# Patient Record
Sex: MALE | Race: WHITE | NOT HISPANIC OR LATINO | Employment: FULL TIME | URBAN - NONMETROPOLITAN AREA
[De-identification: names, ages, dates, MRNs, and addresses within clinical notes are randomized per-mention and may not be internally consistent; named-entity substitution may affect disease eponyms.]

---

## 2023-11-29 ENCOUNTER — OFFICE VISIT (OUTPATIENT)
Dept: PRIMARY CARE | Facility: CLINIC | Age: 58
End: 2023-11-29
Payer: COMMERCIAL

## 2023-11-29 VITALS
OXYGEN SATURATION: 95 % | HEART RATE: 52 BPM | TEMPERATURE: 97.5 F | HEIGHT: 72 IN | WEIGHT: 199.5 LBS | DIASTOLIC BLOOD PRESSURE: 72 MMHG | BODY MASS INDEX: 27.02 KG/M2 | RESPIRATION RATE: 14 BRPM | SYSTOLIC BLOOD PRESSURE: 114 MMHG

## 2023-11-29 DIAGNOSIS — N13.8 BPH WITH OBSTRUCTION/LOWER URINARY TRACT SYMPTOMS: ICD-10-CM

## 2023-11-29 DIAGNOSIS — N52.9 ERECTILE DYSFUNCTION, UNSPECIFIED ERECTILE DYSFUNCTION TYPE: ICD-10-CM

## 2023-11-29 DIAGNOSIS — R06.2 WHEEZE: ICD-10-CM

## 2023-11-29 DIAGNOSIS — Z00.00 HEALTHCARE MAINTENANCE: ICD-10-CM

## 2023-11-29 DIAGNOSIS — R05.9 COUGH, UNSPECIFIED TYPE: ICD-10-CM

## 2023-11-29 DIAGNOSIS — N40.1 BPH WITH OBSTRUCTION/LOWER URINARY TRACT SYMPTOMS: ICD-10-CM

## 2023-11-29 DIAGNOSIS — R39.11 URINARY HESITANCY: ICD-10-CM

## 2023-11-29 DIAGNOSIS — E78.5 HYPERLIPIDEMIA, UNSPECIFIED HYPERLIPIDEMIA TYPE: ICD-10-CM

## 2023-11-29 DIAGNOSIS — Z13.6 SCREENING FOR CARDIOVASCULAR CONDITION: ICD-10-CM

## 2023-11-29 DIAGNOSIS — R35.0 URINARY FREQUENCY: Primary | ICD-10-CM

## 2023-11-29 DIAGNOSIS — Z12.5 SCREENING FOR PROSTATE CANCER: ICD-10-CM

## 2023-11-29 PROBLEM — G44.009 CLUSTER HEADACHES: Status: ACTIVE | Noted: 2023-11-29

## 2023-11-29 PROBLEM — S83.519A ANTERIOR CRUCIATE LIGAMENT DISRUPTION: Status: ACTIVE | Noted: 2023-11-29

## 2023-11-29 PROBLEM — M75.02 ADHESIVE CAPSULITIS OF LEFT SHOULDER: Status: ACTIVE | Noted: 2023-11-29

## 2023-11-29 LAB
POC APPEARANCE, URINE: CLEAR
POC BILIRUBIN, URINE: NEGATIVE
POC BLOOD, URINE: NEGATIVE
POC COLOR, URINE: YELLOW
POC GLUCOSE, URINE: NEGATIVE MG/DL
POC KETONES, URINE: NEGATIVE MG/DL
POC LEUKOCYTES, URINE: NEGATIVE
POC NITRITE,URINE: NEGATIVE
POC PH, URINE: 6 PH
POC PROTEIN, URINE: NEGATIVE MG/DL
POC SPECIFIC GRAVITY, URINE: 1.02
POC UROBILINOGEN, URINE: 0.2 EU/DL

## 2023-11-29 PROCEDURE — 81003 URINALYSIS AUTO W/O SCOPE: CPT | Performed by: FAMILY MEDICINE

## 2023-11-29 PROCEDURE — 1036F TOBACCO NON-USER: CPT | Performed by: FAMILY MEDICINE

## 2023-11-29 PROCEDURE — 99214 OFFICE O/P EST MOD 30 MIN: CPT | Performed by: FAMILY MEDICINE

## 2023-11-29 RX ORDER — SILDENAFIL 100 MG/1
TABLET, FILM COATED ORAL
COMMUNITY
Start: 2018-02-07 | End: 2023-11-29 | Stop reason: SDUPTHER

## 2023-11-29 RX ORDER — SILDENAFIL 100 MG/1
100 TABLET, FILM COATED ORAL AS NEEDED
Qty: 30 TABLET | Refills: 3 | Status: SHIPPED | OUTPATIENT
Start: 2023-11-29

## 2023-11-29 RX ORDER — SILDENAFIL 100 MG/1
100 TABLET, FILM COATED ORAL AS NEEDED
Qty: 10 TABLET | Refills: 1 | Status: SHIPPED | OUTPATIENT
Start: 2023-11-29 | End: 2023-11-29 | Stop reason: SDUPTHER

## 2023-11-29 NOTE — PROGRESS NOTES
Subjective   Patient ID: Miguel Angel Qiu is a 58 y.o. male who presents for Urinary Frequency (Pt presents for urinary frequency- pt states that when he gets the urge he has to go right now, pt states that in the last couple of years it has gotten much worse, slow stream, when going he feels like he has to go but there will not be much), blood work (Pt would also like to get some routine blood work checked.), and Flu Vaccine (Pt declines flu vaccine at this time. ).    HPI     Last saw physician in our office (Shilpa) in 2021  Last saw me in 2018    Patient here for evaluation of urinary issues.  He would also like routine labs.  Declines influenza vaccine.    Patient reports both urinary frequency and urinary hesitancy progressively worsening for 10+ years now but worse in the last couple of years.     He has slow stream and although he will have urgency does not have much come. This occurs during the day but worse when getting out of bed, does get a little better when up and moving around.    Notes may have slight burning sensation when finally able to get stream going but not always.    Was prescribed Flomax in distant past but had side effects on this so stopped. Had retrograde ejaculation and this freaked him out.  Would be willing to do retrial with the Flomax.    Last PSA was 0.64 in 2021.    Additionally, wants to discuss cough and wheezing.  He is a , does get lung toxin exposure from this.  Finds that when he laughs tends to turn into coughing.  Also having wheezing with expiration occasionally.  Finds improvement if he takes a few deep breaths but usually returns.  Most often when laying down at night but does happen otherwise.  Does not notice when at gym or exerting himself.    Always blowing his nose, states has been this way his entire life.  Constant throughout the day but worse in AM.  No symptoms of heartburn.    Non-smoker.  No EtOH use, stopped years ago, has not drank in 7 years  now.    He had CT cardiac scoring in 2017 that was 1.6 (no mention of lung abnormalities at that time).    Review of Systems   All other systems reviewed and are negative.      Objective   /72 (BP Location: Left arm, Patient Position: Sitting, BP Cuff Size: Large adult)   Pulse 52   Temp 36.4 °C (97.5 °F) (Temporal)   Resp 14   Ht 1.829 m (6')   Wt 90.5 kg (199 lb 8 oz)   SpO2 95%   BMI 27.06 kg/m²     Physical Exam  Vitals and nursing note reviewed.   Constitutional:       General: He is not in acute distress.     Appearance: Normal appearance. He is not toxic-appearing.   HENT:      Head: Normocephalic and atraumatic.      Nose:      Right Turbinates: Swollen (mildly edematous and erythematous).      Left Turbinates: Swollen (mildly edematous and erythematous).      Mouth/Throat:      Pharynx: Oropharynx is clear.   Eyes:      Pupils: Pupils are equal, round, and reactive to light.   Neck:      Thyroid: No thyromegaly.   Cardiovascular:      Rate and Rhythm: Regular rhythm.      Heart sounds: No murmur heard.     No friction rub. No gallop.   Pulmonary:      Effort: Pulmonary effort is normal.      Breath sounds: Normal breath sounds. No wheezing, rhonchi or rales.   Abdominal:      General: Bowel sounds are normal. There is no distension.      Palpations: Abdomen is soft. There is no mass.      Tenderness: There is no abdominal tenderness. There is no guarding.   Musculoskeletal:      Right lower leg: No edema.      Left lower leg: No edema.   Lymphadenopathy:      Cervical: No cervical adenopathy.   Neurological:      General: No focal deficit present.      Mental Status: He is alert and oriented to person, place, and time.   Psychiatric:         Mood and Affect: Mood normal.         Behavior: Behavior normal.         Assessment/Plan   Problem List Items Addressed This Visit             ICD-10-CM    BPH with obstruction/lower urinary tract symptoms N40.1, N13.8     Patient w/ long standing history  of known BPH and LUTS ongoing for years now with recent increase in urinary frequency, hesitancy, and urgency. Wishes to do retrial of Flomax when he was prescribed in past but stopped due to side effect of retrograde ejaculation. Agreeable to starting this back at low dose to see if effective or able to tolerate.    UA done in office today.  PSA ordered with routine blood work (Last PSA was 0.64 in 2021 which was normal).         Erectile dysfunction N52.9     Sildenafil refilled today  Can use good rx if cost prohibitive via insurance.         Relevant Medications    sildenafil (Viagra) 100 mg tablet    Hyperlipidemia E78.5     2017 CACS 1.6  Repeat lipid panel ordered today.  Repeat CT cardiac scoring ordered today.  Can further discuss at next routine visit.    We have ordered a coronary artery calcium score to better determine how to treat your elevated cholesterol. This is a CT scan to determine if you have calcified plaque in your coronary arteries. Coronary artery calcium scores are used to help us determine how to best treat your elevated cholesterol.  The risks of this screen is we may find things that we are not looking for that we will have to follow up on such as lung nodules which are very common in [facility] fatty liver which is common in individuals that are overweight. There is also risk of radiation exposure. Follow up visit will be scheduled to review coronary artery calcium score,          Healthcare maintenance Z00.00     Routine labs ordered today, patient to return to review these and complete physical/wellness portion of visit.  CPE not completed today - only labs ordered.         Relevant Orders    CBC    Comprehensive Metabolic Panel    Lipid Panel    Cough R05.9     + cough with laughing, + wheezing with expiration, + post-nasal drainage  No chest pain, no SOB, no heartburn.  Lungs clear to auscultation on exam today.  VS stable, SpO2 95%    Repeat CT cardiac scoring ordered today,  lungs will be evaluated on this.  PFTs (pulmonary function test) ordered today.    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  Non-smoker  No EtOH since 2016    Suspect possible component of silent reflux, can further discuss upon results of above studies at follow-up.         Relevant Orders    Pulmonary function testing (Ancillary Performed)     Other Visit Diagnoses         Codes    Urinary frequency    -  Primary R35.0    Relevant Orders    POCT UA Automated manually resulted (Completed)    Urinary hesitancy     R39.11    Screening for prostate cancer     Z12.5    Relevant Orders    Prostate Specific Antigen, Screen    Wheeze     R06.2    Relevant Orders    Pulmonary function testing (Ancillary Performed)    Screening for cardiovascular condition     Z13.6    Relevant Orders    CT cardiac scoring wo IV contrast            Follow-up in 3-4 months for recheck above + review studies + CPE.  Call for sooner follow-up if needed.     Scribe Attestation  By signing my name below, IBarbi Scribe   attest that this documentation has been prepared under the direction and in the presence of Ann Cabrera DO.

## 2023-11-29 NOTE — ASSESSMENT & PLAN NOTE
+ cough with laughing, + wheezing with expiration, + post-nasal drainage  No chest pain, no SOB, no heartburn.  Lungs clear to auscultation on exam today.  VS stable, SpO2 95%    Repeat CT cardiac scoring ordered today, lungs will be evaluated on this.  PFTs (pulmonary function test) ordered today.    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  Non-smoker  No EtOH since 2016    Suspect possible component of silent reflux, can further discuss upon results of above studies at follow-up.  
2017 CACS 1.6  Repeat lipid panel ordered today.  Repeat CT cardiac scoring ordered today.  Can further discuss at next routine visit.    We have ordered a coronary artery calcium score to better determine how to treat your elevated cholesterol. This is a CT scan to determine if you have calcified plaque in your coronary arteries. Coronary artery calcium scores are used to help us determine how to best treat your elevated cholesterol.  The risks of this screen is we may find things that we are not looking for that we will have to follow up on such as lung nodules which are very common in [facility] fatty liver which is common in individuals that are overweight. There is also risk of radiation exposure. Follow up visit will be scheduled to review coronary artery calcium score,   
Patient w/ long standing history of known BPH and LUTS ongoing for years now with recent increase in urinary frequency, hesitancy, and urgency. Wishes to do retrial of Flomax when he was prescribed in past but stopped due to side effect of retrograde ejaculation. Agreeable to starting this back at low dose to see if effective or able to tolerate.    UA done in office today.  PSA ordered with routine blood work (Last PSA was 0.64 in 2021 which was normal).  
Routine labs ordered today, patient to return to review these and complete physical/wellness portion of visit.  CPE not completed today - only labs ordered.  
Sildenafil refilled today  Can use good rx if cost prohibitive via insurance.  
Detail Level: Zone
Detail Level: Simple

## 2023-12-08 ENCOUNTER — TELEPHONE (OUTPATIENT)
Dept: PRIMARY CARE | Facility: CLINIC | Age: 58
End: 2023-12-08
Payer: COMMERCIAL

## 2023-12-08 NOTE — TELEPHONE ENCOUNTER
Patient is looking for an order to be sent to [facility] in [place] for the Flowmax .        [phone]

## 2023-12-09 ENCOUNTER — LAB (OUTPATIENT)
Dept: LAB | Facility: LAB | Age: 58
End: 2023-12-09
Payer: COMMERCIAL

## 2023-12-09 DIAGNOSIS — Z00.00 HEALTHCARE MAINTENANCE: ICD-10-CM

## 2023-12-09 DIAGNOSIS — N40.1 BPH WITH OBSTRUCTION/LOWER URINARY TRACT SYMPTOMS: Primary | ICD-10-CM

## 2023-12-09 DIAGNOSIS — Z12.5 SCREENING FOR PROSTATE CANCER: ICD-10-CM

## 2023-12-09 DIAGNOSIS — N13.8 BPH WITH OBSTRUCTION/LOWER URINARY TRACT SYMPTOMS: Primary | ICD-10-CM

## 2023-12-09 LAB
ALBUMIN SERPL BCP-MCNC: 4.9 G/DL (ref 3.4–5)
ALP SERPL-CCNC: 87 U/L (ref 33–120)
ALT SERPL W P-5'-P-CCNC: 17 U/L (ref 10–52)
ANION GAP SERPL CALC-SCNC: 15 MMOL/L (ref 10–20)
AST SERPL W P-5'-P-CCNC: 18 U/L (ref 9–39)
BILIRUB SERPL-MCNC: 1.5 MG/DL (ref 0–1.2)
BUN SERPL-MCNC: 22 MG/DL (ref 6–23)
CALCIUM SERPL-MCNC: 10.3 MG/DL (ref 8.6–10.6)
CHLORIDE SERPL-SCNC: 104 MMOL/L (ref 98–107)
CHOLEST SERPL-MCNC: 315 MG/DL (ref 0–199)
CHOLESTEROL/HDL RATIO: 7.2
CO2 SERPL-SCNC: 28 MMOL/L (ref 21–32)
CREAT SERPL-MCNC: 1.27 MG/DL (ref 0.5–1.3)
ERYTHROCYTE [DISTWIDTH] IN BLOOD BY AUTOMATED COUNT: 12.9 % (ref 11.5–14.5)
GFR SERPL CREATININE-BSD FRML MDRD: 66 ML/MIN/1.73M*2
GLUCOSE SERPL-MCNC: 104 MG/DL (ref 74–99)
HCT VFR BLD AUTO: 47.6 % (ref 41–52)
HDLC SERPL-MCNC: 43.8 MG/DL
HGB BLD-MCNC: 15 G/DL (ref 13.5–17.5)
LDLC SERPL CALC-MCNC: 247 MG/DL
MCH RBC QN AUTO: 29.4 PG (ref 26–34)
MCHC RBC AUTO-ENTMCNC: 31.5 G/DL (ref 32–36)
MCV RBC AUTO: 93 FL (ref 80–100)
NON HDL CHOLESTEROL: 271 MG/DL (ref 0–149)
NRBC BLD-RTO: 0 /100 WBCS (ref 0–0)
PLATELET # BLD AUTO: 267 X10*3/UL (ref 150–450)
POTASSIUM SERPL-SCNC: 4.6 MMOL/L (ref 3.5–5.3)
PROT SERPL-MCNC: 7.7 G/DL (ref 6.4–8.2)
RBC # BLD AUTO: 5.1 X10*6/UL (ref 4.5–5.9)
SODIUM SERPL-SCNC: 142 MMOL/L (ref 136–145)
TRIGL SERPL-MCNC: 122 MG/DL (ref 0–149)
VLDL: 24 MG/DL (ref 0–40)
WBC # BLD AUTO: 5.8 X10*3/UL (ref 4.4–11.3)

## 2023-12-09 PROCEDURE — 85027 COMPLETE CBC AUTOMATED: CPT

## 2023-12-09 PROCEDURE — 80053 COMPREHEN METABOLIC PANEL: CPT

## 2023-12-09 PROCEDURE — 80061 LIPID PANEL: CPT

## 2023-12-09 PROCEDURE — 84153 ASSAY OF PSA TOTAL: CPT

## 2023-12-09 PROCEDURE — 36415 COLL VENOUS BLD VENIPUNCTURE: CPT

## 2023-12-09 RX ORDER — TAMSULOSIN HYDROCHLORIDE 0.4 MG/1
0.4 CAPSULE ORAL DAILY
Qty: 30 CAPSULE | Refills: 11 | Status: SHIPPED | OUTPATIENT
Start: 2023-12-09 | End: 2024-12-08

## 2023-12-10 LAB — PSA SERPL-MCNC: 0.66 NG/ML

## 2023-12-12 ENCOUNTER — HOSPITAL ENCOUNTER (OUTPATIENT)
Dept: RESPIRATORY THERAPY | Facility: HOSPITAL | Age: 58
Discharge: HOME | End: 2023-12-12
Payer: COMMERCIAL

## 2023-12-12 DIAGNOSIS — R05.9 COUGH, UNSPECIFIED TYPE: ICD-10-CM

## 2023-12-12 DIAGNOSIS — R06.2 WHEEZE: ICD-10-CM

## 2023-12-12 PROCEDURE — 94726 PLETHYSMOGRAPHY LUNG VOLUMES: CPT

## 2023-12-13 LAB
MGC ASCENT PFT - FEV1 - POST: 4.03
MGC ASCENT PFT - FEV1 - PRE: 3.47
MGC ASCENT PFT - FEV1 - PREDICTED: 3.73
MGC ASCENT PFT - FVC - POST: 6.3
MGC ASCENT PFT - FVC - PRE: 5.74
MGC ASCENT PFT - FVC - PREDICTED: 4.79

## 2023-12-21 ENCOUNTER — OFFICE VISIT (OUTPATIENT)
Dept: PRIMARY CARE | Facility: CLINIC | Age: 58
End: 2023-12-21
Payer: COMMERCIAL

## 2023-12-21 VITALS
SYSTOLIC BLOOD PRESSURE: 95 MMHG | WEIGHT: 198 LBS | HEART RATE: 57 BPM | HEIGHT: 72 IN | TEMPERATURE: 96.9 F | RESPIRATION RATE: 14 BRPM | OXYGEN SATURATION: 96 % | DIASTOLIC BLOOD PRESSURE: 61 MMHG | BODY MASS INDEX: 26.82 KG/M2

## 2023-12-21 DIAGNOSIS — R73.9 HYPERGLYCEMIA: ICD-10-CM

## 2023-12-21 DIAGNOSIS — E80.4 GILBERT'S SYNDROME: ICD-10-CM

## 2023-12-21 DIAGNOSIS — E66.3 OVERWEIGHT WITH BODY MASS INDEX (BMI) OF 26 TO 26.9 IN ADULT: ICD-10-CM

## 2023-12-21 DIAGNOSIS — Z00.00 HEALTHCARE MAINTENANCE: Primary | ICD-10-CM

## 2023-12-21 DIAGNOSIS — N40.1 BPH WITH OBSTRUCTION/LOWER URINARY TRACT SYMPTOMS: ICD-10-CM

## 2023-12-21 DIAGNOSIS — N13.8 BPH WITH OBSTRUCTION/LOWER URINARY TRACT SYMPTOMS: ICD-10-CM

## 2023-12-21 DIAGNOSIS — E78.5 HYPERLIPIDEMIA, UNSPECIFIED HYPERLIPIDEMIA TYPE: ICD-10-CM

## 2023-12-21 PROCEDURE — 3008F BODY MASS INDEX DOCD: CPT | Performed by: FAMILY MEDICINE

## 2023-12-21 PROCEDURE — 90750 HZV VACC RECOMBINANT IM: CPT | Performed by: FAMILY MEDICINE

## 2023-12-21 PROCEDURE — 1036F TOBACCO NON-USER: CPT | Performed by: FAMILY MEDICINE

## 2023-12-21 PROCEDURE — 99396 PREV VISIT EST AGE 40-64: CPT | Performed by: FAMILY MEDICINE

## 2023-12-21 PROCEDURE — 90471 IMMUNIZATION ADMIN: CPT | Performed by: FAMILY MEDICINE

## 2023-12-21 NOTE — ASSESSMENT & PLAN NOTE
Currently lifestyle managed  2017 CACS 1.6 - repeat ordered 11/2023 12/2023 TC/HDL ratio 7.2, HDL 43, , TRIG 271  Goal TC/HDL ratio 3.4 or less, LDL 99 or less,  or less, and TRIG 150 or less.     Cholesterol significantly elevated, cardiac guidelines recommend starting statin therapy with LDL >190 in someone over the age of 40 regardless of results of the CT cardiac scoring.    Await repeat CT cardiac scoring, he will see jimmy today to schedule.    Would recommend statin based on cholesterol numbers alone, patient wishes to have repeat scan before deciding. He states he has not changed his diet or activity level since last scan in 2017 that was a score of 1.6. If significant amount of plaque seen on repeat he would be open to starting statin.    In the interim, non-prescription measures recommended:  -can do trial with supplement Bergamot, see patient summary.  -make sure you are avoiding refined carbs such as breads, pasta, cereal, candy, soda,  nutrition bars, granola, chips, and sugar sweetened beverages.      -eat 5- 7 servings daily of veggies,  healthy protein such as chicken, fish,  beans, and eggs, and include healthy fats in your diet such as seeds, nuts, olive oil, avocados, and salmon.   -exercise 4 - 6 days per week as you are able, 150 minutes total weekly divided up is recommended.  -consider taking the fiber product psyllium capsules or powder 2 times daily (generic brand is fine) , or a brand name psyllium such as [employer] or Metamucil.  -consider also adding plant stanols and sterols such as Nature Made CholestOff, available over the counter.

## 2023-12-21 NOTE — PATIENT INSTRUCTIONS
Bergamot 500 - 1000 mg daily x 3 mo on     3 mo off   can repeat 2 x yearly    Vit D3 2000 international units daily     Hyperlipidemia  Currently lifestyle managed  2017 CACS 1.6 - repeat ordered 11/2023 12/2023 TC/HDL ratio 7.2, HDL 43, , TRIG 271  Goal TC/HDL ratio 3.4 or less, LDL 99 or less,  or less, and TRIG 150 or less.     Cholesterol significantly elevated, cardiac guidelines recommend starting statin therapy with LDL >190 in someone over the age of 40 regardless of results of the CT cardiac scoring.    Await repeat CT cardiac scoring, he will see jimmy today to schedule.    Would recommend statin based on cholesterol numbers alone, patient wishes to have repeat scan before deciding. He states he has not changed his diet or activity level since last scan in 2017 that was a score of 1.6. If significant amount of plaque seen on repeat he would be open to starting statin.    In the interim, non-prescription measures recommended:  -can do trial with supplement Bergamot, see patient summary.  -make sure you are avoiding refined carbs such as breads, pasta, cereal, candy, soda,  nutrition bars, granola, chips, and sugar sweetened beverages.      -eat 5- 7 servings daily of veggies,  healthy protein such as chicken, fish,  beans, and eggs, and include healthy fats in your diet such as seeds, nuts, olive oil, avocados, and salmon.   -exercise 4 - 6 days per week as you are able, 150 minutes total weekly divided up is recommended.  -consider taking the fiber product psyllium capsules or powder 2 times daily (generic brand is fine) , or a brand name psyllium such as [employer] [facility] or Metamucil.  -consider also adding plant stanols and sterols such as Nature Made CholestOff, available over the counter.     Hyperglycemia  Mildly elevated fasting glucose, very mild prediabetic range.  No recent A1c (3 month long glucose average), will get A1c with next lab work.  Diet and exercise recommendations  revisited, see hyperlipidemia A/P and wellness A/P.      Healthcare maintenance  Vaccines and screenings reviewed.  Questionnaires completed.  Health and wellness topics reviewed.  Diet and exercise recommendations revisited.  Routine blood work reviewed today.     VACCINES:  -Flu vaccine deferred today  -TDAP deferred today  --Shingrix #1 administered today, #2 (final dose) due anytime after 2 months from today.  Shingles vaccine (Shingrix) is recommended.  This vaccine is expensive but extremely effective.   This is to be administered in 2 separate doses, 2- 6 months apart.   This is a killed virus vaccine, so no risk of chicken pox or shingles with administration.  The vaccine is approximately 90 % effective to protect against shingles even 5 years out.   Side effects of the vaccine can include soreness of the arm at administration site and possible flu-like symptoms after administration.    SCREENINGS:  -Screening PSA is UTD, normal 12/2023  -Screening colonoscopy is UTD, completed in 2020, repeat in 5 years per GI.    LIFESTYLE MEASURES  -make sure you are avoiding refined carbs such as breads, pasta, cereal, candy, soda,  nutrition bars, granola, chips, and sugar sweetened beverages.      -eat 5- 7 servings daily of veggies,  healthy protein such as chicken, fish,  beans, and eggs, and include healthy fats in your diet such as seeds, nuts, olive oil, avocados, and salmon.   -exercise 4 - 6 days per week as you are able, 150 minutes total weekly divided up is recommended.  -Vitamin D is recommended at 1000 - 5000 IU international units daily.   -Always wear sunscreen when you have sun exposure.  -64 oz of water is recommended daily.  -Dental visits recommended every 6 months.  -Eye exam recommended every 2 years, for those with vision problems every year.

## 2023-12-21 NOTE — ASSESSMENT & PLAN NOTE
Mildly elevated fasting glucose, very mild prediabetic range.  No recent A1c (3 month long glucose average), will get A1c with next lab work.  Diet and exercise recommendations revisited, see hyperlipidemia A/P and wellness A/P.

## 2023-12-21 NOTE — ASSESSMENT & PLAN NOTE
Vaccines and screenings reviewed.  Questionnaires completed.  Health and wellness topics reviewed.  Diet and exercise recommendations revisited.  Routine blood work reviewed today.     VACCINES:  -Flu vaccine deferred today  -TDAP deferred today  --Shingrix #1 administered today, #2 (final dose) due anytime after 2 months from today.  Shingles vaccine (Shingrix) is recommended.  This vaccine is expensive but extremely effective.   This is to be administered in 2 separate doses, 2- 6 months apart.   This is a killed virus vaccine, so no risk of chicken pox or shingles with administration.  The vaccine is approximately 90 % effective to protect against shingles even 5 years out.   Side effects of the vaccine can include soreness of the arm at administration site and possible flu-like symptoms after administration.    SCREENINGS:  -Screening PSA is UTD, normal 12/2023  -Screening colonoscopy is UTD, completed in 2020, repeat in 5 years per GI.    LIFESTYLE MEASURES  -make sure you are avoiding refined carbs such as breads, pasta, cereal, candy, soda,  nutrition bars, granola, chips, and sugar sweetened beverages.      -eat 5- 7 servings daily of veggies,  healthy protein such as chicken, fish,  beans, and eggs, and include healthy fats in your diet such as seeds, nuts, olive oil, avocados, and salmon.   -exercise 4 - 6 days per week as you are able, 150 minutes total weekly divided up is recommended.  -Vitamin D is recommended at 1000 - 5000 IU international units daily.   -Always wear sunscreen when you have sun exposure.  -64 oz of water is recommended daily.  -Dental visits recommended every 6 months.  -Eye exam recommended every 2 years, for those with vision problems every year.

## 2023-12-21 NOTE — PROGRESS NOTES
Subjective   Patient ID: Miguel Angel Qiu is a 58 y.o. male who presents for Annual Exam (Pt presents for annual physical exam- ABN given to pt and signed, pt verbalized understanding. ) and Flu Vaccine (Pt declines flu vaccine at this time.).    HPI     Patient presents today for annual physical.  Patient is doing well overall, they have no new concerns or issues.     WELLNESS VISIT   TDAP: none found  SHINGRIX: none found  PNEUMOVAX: N/A  CSCOPE: 5/2020 (normal mucosa, hemorrhoids, repeat 5 years per GI (hx of adenomatous polyp in 2017)   PSA: 0.66 in 12/2023  AAA SCREEN: N/A  HEP C SCREEN: none found  CACS: 1.6 in 2017 - repeat CACS ordered 11/2023    Patient declines influenza vaccine.  Patient is agreeable to Shingrix vaccine.  Patient declines TDAP vaccine.    Diet: overall healthy  Exercise: active, fit  Alcohol use: none  Smoking: non-smoker    Dental: UTD  Vision: UTD    Prostate cancer screening: UTD  Denies family history in first degree relative.  Denies hesitancy, nocturia, or urgency.     Colon cancer screening: UTD  Denies family history in first degree relative.  Denies melena, hematochezia, constipation, diarrhea, bloating, change in bowel habits.     ROUTINE VISIT  CHRONIC CONDITIONS:   -COUGH  + cough with laughing, + wheezing with expiration, + post-nasal drainage  No chest pain, no SOB, no heartburn.  Lungs clear to auscultation on exam today.  VS stable, SpO2 95%     Repeat CT cardiac scoring ordered today, lungs will be evaluated on this.  PFTs (pulmonary function test) ordered today.     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  Non-smoker  No EtOH since 2016     Suspect possible component of silent reflux, can further discuss upon results of above studies at follow-up.    12/12/2023 PFTs: Reduced FEV1/FVC with a normal FVC indicates a mild airflow obstructive defect. Lung volumes by plethysmography indicate hyperinflation process. The DLCO is normal.     -BPH w/ LUTS  Resumed  Flomax per 11/2023 OV, previously d/c'ed due to retrograde ejaculation.    11/2023 urinalysis normal  12/2023 PSA normal    -HLD  Lifestyle managed  2017 CACS 1.6 - repeat ordered 11/2023 12/2023 TC/HDL ratio 7.2, Hdl 43, , TRIG 271    Patient denies any facial droop, sudden vision loss, weakness or numbness on one side of body.   Patient denies chest pain, shortness of breath with exertion, palpitations, or symptoms of claudication.       Review of Systems   All other systems reviewed and are negative.      Objective   BP 95/61 (BP Location: Right arm, Patient Position: Sitting, BP Cuff Size: Large adult)   Pulse 57   Temp 36.1 °C (96.9 °F) (Temporal)   Resp 14   Ht 1.829 m (6')   Wt 89.8 kg (198 lb)   SpO2 96%   BMI 26.85 kg/m²     Physical Exam  Vitals and nursing note reviewed.   Constitutional:       General: He is not in acute distress.     Appearance: Normal appearance. He is not toxic-appearing.   HENT:      Head: Normocephalic and atraumatic.   Neck:      Thyroid: No thyromegaly.   Cardiovascular:      Rate and Rhythm: Normal rate and regular rhythm.      Heart sounds: No murmur heard.     No friction rub. No gallop.   Pulmonary:      Effort: Pulmonary effort is normal.      Breath sounds: Normal breath sounds. No wheezing, rhonchi or rales.   Abdominal:      General: Bowel sounds are normal. There is no distension.      Palpations: Abdomen is soft. There is no mass.      Tenderness: There is no abdominal tenderness. There is no guarding.   Musculoskeletal:      Right lower leg: No edema.      Left lower leg: No edema.   Lymphadenopathy:      Cervical: No cervical adenopathy.   Neurological:      General: No focal deficit present.      Mental Status: He is alert and oriented to person, place, and time.   Psychiatric:         Mood and Affect: Mood normal.         Behavior: Behavior normal.         Assessment/Plan   Problem List Items Addressed This Visit             ICD-10-CM    BPH with  obstruction/lower urinary tract symptoms N40.1, N13.8     Stable, continue Flomax as prescribed.         Hyperlipidemia E78.5     Currently lifestyle managed  2017 CACS 1.6 - repeat ordered 11/2023 12/2023 TC/HDL ratio 7.2, HDL 43, , TRIG 271  Goal TC/HDL ratio 3.4 or less, LDL 99 or less,  or less, and TRIG 150 or less.     Cholesterol significantly elevated, cardiac guidelines recommend starting statin therapy with LDL >190 in someone over the age of 40 regardless of results of the CT cardiac scoring.    Await repeat CT cardiac scoring, he will see jimmy today to schedule.    Would recommend statin based on cholesterol numbers alone, patient wishes to have repeat scan before deciding. He states he has not changed his diet or activity level since last scan in 2017 that was a score of 1.6. If significant amount of plaque seen on repeat he would be open to starting statin.    In the interim, non-prescription measures recommended:  -can do trial with supplement Bergamot, see patient summary.  -make sure you are avoiding refined carbs such as breads, pasta, cereal, candy, soda,  nutrition bars, granola, chips, and sugar sweetened beverages.      -eat 5- 7 servings daily of veggies,  healthy protein such as chicken, fish,  beans, and eggs, and include healthy fats in your diet such as seeds, nuts, olive oil, avocados, and salmon.   -exercise 4 - 6 days per week as you are able, 150 minutes total weekly divided up is recommended.  -consider taking the fiber product psyllium capsules or powder 2 times daily (generic brand is fine) , or a brand name psyllium such as [employer] [facility] [employer] or Metamucil.  -consider also adding plant stanols and sterols such as Nature Made CholestOff, available over the counter.          Healthcare maintenance - Primary Z00.00     Vaccines and screenings reviewed.  Questionnaires completed.  Health and wellness topics reviewed.  Diet and exercise recommendations  revisited.  Routine blood work reviewed today.     VACCINES:  -Flu vaccine deferred today  -TDAP deferred today  --Shingrix #1 administered today, #2 (final dose) due anytime after 2 months from today.  Shingles vaccine (Shingrix) is recommended.  This vaccine is expensive but extremely effective.   This is to be administered in 2 separate doses, 2- 6 months apart.   This is a killed virus vaccine, so no risk of chicken pox or shingles with administration.  The vaccine is approximately 90 % effective to protect against shingles even 5 years out.   Side effects of the vaccine can include soreness of the arm at administration site and possible flu-like symptoms after administration.    SCREENINGS:  -Screening PSA is UTD, normal 12/2023  -Screening colonoscopy is UTD, completed in 2020, repeat in 5 years per GI.    LIFESTYLE MEASURES  -make sure you are avoiding refined carbs such as breads, pasta, cereal, candy, soda,  nutrition bars, granola, chips, and sugar sweetened beverages.      -eat 5- 7 servings daily of veggies,  healthy protein such as chicken, fish,  beans, and eggs, and include healthy fats in your diet such as seeds, nuts, olive oil, avocados, and salmon.   -exercise 4 - 6 days per week as you are able, 150 minutes total weekly divided up is recommended.  -Vitamin D is recommended at 1000 - 5000 IU international units daily.   -Always wear sunscreen when you have sun exposure.  -64 oz of water is recommended daily.  -Dental visits recommended every 6 months.  -Eye exam recommended every 2 years, for those with vision problems every year.           Hyperglycemia R73.9     Mildly elevated fasting glucose, very mild prediabetic range.  No recent A1c (3 month long glucose average), will get A1c with next lab work.  Diet and exercise recommendations revisited, see hyperlipidemia A/P and wellness A/P.           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          Other Visit Diagnoses         Codes     Overweight with body mass index (BMI) of 26 to 26.9 in adult     E66.3, Z68.26            Follow-up in 1 year for routine visit + annual physical.   Call for sooner follow-up if needed.     Scribe Attestation  By signing my name below, IBarbi Scribe   attest that this documentation has been prepared under the direction and in the presence of Ann Cabrera DO.

## 2024-01-02 ENCOUNTER — APPOINTMENT (OUTPATIENT)
Dept: ORTHOPEDIC SURGERY | Facility: CLINIC | Age: 59
End: 2024-01-02
Payer: COMMERCIAL

## 2024-01-12 DIAGNOSIS — M25.511 RIGHT SHOULDER PAIN, UNSPECIFIED CHRONICITY: ICD-10-CM

## 2024-01-16 ENCOUNTER — ANCILLARY PROCEDURE (OUTPATIENT)
Dept: RADIOLOGY | Facility: CLINIC | Age: 59
End: 2024-01-16
Payer: COMMERCIAL

## 2024-01-16 ENCOUNTER — OFFICE VISIT (OUTPATIENT)
Dept: ORTHOPEDIC SURGERY | Facility: CLINIC | Age: 59
End: 2024-01-16
Payer: COMMERCIAL

## 2024-01-16 DIAGNOSIS — M75.01 ADHESIVE CAPSULITIS OF RIGHT SHOULDER: ICD-10-CM

## 2024-01-16 DIAGNOSIS — M25.511 RIGHT SHOULDER PAIN, UNSPECIFIED CHRONICITY: ICD-10-CM

## 2024-01-16 DIAGNOSIS — M19.011 ARTHRITIS OF RIGHT ACROMIOCLAVICULAR JOINT: ICD-10-CM

## 2024-01-16 PROCEDURE — 3008F BODY MASS INDEX DOCD: CPT | Performed by: ORTHOPAEDIC SURGERY

## 2024-01-16 PROCEDURE — 99213 OFFICE O/P EST LOW 20 MIN: CPT | Performed by: ORTHOPAEDIC SURGERY

## 2024-01-16 PROCEDURE — 73030 X-RAY EXAM OF SHOULDER: CPT | Mod: RT

## 2024-01-16 PROCEDURE — 73030 X-RAY EXAM OF SHOULDER: CPT | Mod: RIGHT SIDE | Performed by: RADIOLOGY

## 2024-01-16 PROCEDURE — 1036F TOBACCO NON-USER: CPT | Performed by: ORTHOPAEDIC SURGERY

## 2024-01-16 PROCEDURE — 20610 DRAIN/INJ JOINT/BURSA W/O US: CPT | Performed by: ORTHOPAEDIC SURGERY

## 2024-01-16 RX ORDER — METHYLPREDNISOLONE ACETATE 40 MG/ML
40 INJECTION, SUSPENSION INTRA-ARTICULAR; INTRALESIONAL; INTRAMUSCULAR; SOFT TISSUE
Status: COMPLETED | OUTPATIENT
Start: 2024-01-16 | End: 2024-01-16

## 2024-01-16 RX ORDER — LIDOCAINE HYDROCHLORIDE 10 MG/ML
10 INJECTION INFILTRATION; PERINEURAL
Status: COMPLETED | OUTPATIENT
Start: 2024-01-16 | End: 2024-01-16

## 2024-01-16 RX ADMIN — LIDOCAINE HYDROCHLORIDE 10 ML: 10 INJECTION INFILTRATION; PERINEURAL at 08:50

## 2024-01-16 RX ADMIN — METHYLPREDNISOLONE ACETATE 40 MG: 40 INJECTION, SUSPENSION INTRA-ARTICULAR; INTRALESIONAL; INTRAMUSCULAR; SOFT TISSUE at 08:50

## 2024-01-16 NOTE — PROGRESS NOTES
Patient returns to see me today for his right shoulder.  He has known osteoarthritis of his acromioclavicular joint he responded reasonably well to the injection but it did not last.  Most of the pain continues to be in the AC joint area however he also like on his other side is started to develop a bit of a frozen shoulder on top of it he wants to have surgery for the AC joint however at this point with the stiffness we need to get that improved prior to his surgical intervention.    He lacks about terminal 15 degrees in each direction on range of motion he continues to be very sore over his acromioclavicular joint.  Rotator cuff strength is good.  At this point we gave him an injection to his right glenohumeral joint and subacromial space he will work hard on range of motion over the next month and physical therapy.  He will also obtain an MRI scan of the right shoulder to evaluate his acromioclavicular joint for any other associated pathology    We will plan to see him back in 1 month's time for clinical recheck and if his range of motion is improved we can schedule him for his distal clavicle excision.  We did also review the MRI scan to ensure there is no other associated pathology remains to be addressed at the time of surgery.    Patient ID: Miguel Angel Qiu is a 58 y.o. male.    L Inj/Asp: R subacromial bursa on 1/16/2024 8:50 AM  Indications: pain  Details: 22 G needle, posterior approach  Medications: 40 mg methylPREDNISolone acetate 40 mg/mL; 10 mL lidocaine 10 mg/mL (1 %)    Under sterile conditions the right shoulder was injected with 5cc of lidocaine 40mg Depo.  The patient tolerated the procedure well.  There were no apparent complications.  Sterile bandage was applied.  Procedure, treatment alternatives, risks and benefits explained, specific risks discussed. Consent was given by the patient. Immediately prior to procedure a time out was called to verify the correct patient, procedure, equipment,  support staff and site/side marked as required. Patient was prepped and draped in the usual sterile fashion.

## 2024-01-30 ENCOUNTER — HOSPITAL ENCOUNTER (OUTPATIENT)
Dept: RADIOLOGY | Facility: CLINIC | Age: 59
Discharge: HOME | End: 2024-01-30
Payer: COMMERCIAL

## 2024-01-30 DIAGNOSIS — M19.011 ARTHRITIS OF RIGHT ACROMIOCLAVICULAR JOINT: ICD-10-CM

## 2024-01-30 PROCEDURE — 73221 MRI JOINT UPR EXTREM W/O DYE: CPT | Mod: RT

## 2024-01-30 PROCEDURE — 73221 MRI JOINT UPR EXTREM W/O DYE: CPT | Mod: RIGHT SIDE | Performed by: RADIOLOGY

## 2024-02-09 ENCOUNTER — HOSPITAL ENCOUNTER (OUTPATIENT)
Dept: RADIOLOGY | Facility: CLINIC | Age: 59
Discharge: HOME | End: 2024-02-09
Payer: COMMERCIAL

## 2024-02-09 DIAGNOSIS — Z13.6 SCREENING FOR CARDIOVASCULAR CONDITION: ICD-10-CM

## 2024-02-09 PROCEDURE — 75571 CT HRT W/O DYE W/CA TEST: CPT

## 2024-02-14 ENCOUNTER — APPOINTMENT (OUTPATIENT)
Dept: PRIMARY CARE | Facility: CLINIC | Age: 59
End: 2024-02-14
Payer: COMMERCIAL

## 2024-02-26 ENCOUNTER — APPOINTMENT (OUTPATIENT)
Dept: PRIMARY CARE | Facility: CLINIC | Age: 59
End: 2024-02-26
Payer: COMMERCIAL

## 2024-02-27 ENCOUNTER — TELEPHONE (OUTPATIENT)
Dept: PRIMARY CARE | Facility: CLINIC | Age: 59
End: 2024-02-27

## 2024-02-27 NOTE — TELEPHONE ENCOUNTER
FYI-   Please see CT cardiac scoring test done on 02/09/24  messages has been left for pt to follow up with you and needed a later apt.   I seen on the follow up work list that pt has canceled apts and not followed up re results.

## 2024-03-05 ENCOUNTER — OFFICE VISIT (OUTPATIENT)
Dept: ORTHOPEDIC SURGERY | Facility: CLINIC | Age: 59
End: 2024-03-05
Payer: COMMERCIAL

## 2024-03-05 DIAGNOSIS — M75.02 ADHESIVE CAPSULITIS OF LEFT SHOULDER: Primary | ICD-10-CM

## 2024-03-05 PROCEDURE — 99213 OFFICE O/P EST LOW 20 MIN: CPT | Performed by: ORTHOPAEDIC SURGERY

## 2024-03-05 PROCEDURE — 1036F TOBACCO NON-USER: CPT | Performed by: ORTHOPAEDIC SURGERY

## 2024-03-05 PROCEDURE — 3008F BODY MASS INDEX DOCD: CPT | Performed by: ORTHOPAEDIC SURGERY

## 2024-03-05 NOTE — PROGRESS NOTES
Patient returns to see me today he did physical therapy for his shoulder he regained his range of motion and now the right shoulder feels great he did get an MRI scan that shows some rotator cuff tendinopathy a little bit early glenohumeral arthritis and some AC joint arthritis is pretty severe.  At this point is not bothering him too much so he wants to continue with just physical therapy exercises and conservative care I think is very reasonable.  He does have a little bit of stiffness remaining on the left side so getting him started in physical therapy for that to work on the frozen shoulder.  He may follow-up with us on an as-needed basis

## 2024-08-30 ENCOUNTER — LAB REQUISITION (OUTPATIENT)
Dept: LAB | Facility: HOSPITAL | Age: 59
End: 2024-08-30
Payer: COMMERCIAL

## 2024-08-30 DIAGNOSIS — B34.9 VIRAL INFECTION, UNSPECIFIED: ICD-10-CM

## 2024-08-30 PROCEDURE — 87635 SARS-COV-2 COVID-19 AMP PRB: CPT

## 2024-08-31 LAB — SARS-COV-2 ORF1AB RESP QL NAA+PROBE: NOT DETECTED

## 2024-10-06 DIAGNOSIS — N13.8 BPH WITH OBSTRUCTION/LOWER URINARY TRACT SYMPTOMS: ICD-10-CM

## 2024-10-06 DIAGNOSIS — N40.1 BPH WITH OBSTRUCTION/LOWER URINARY TRACT SYMPTOMS: ICD-10-CM

## 2024-10-07 RX ORDER — TAMSULOSIN HYDROCHLORIDE 0.4 MG/1
0.4 CAPSULE ORAL DAILY
Qty: 90 CAPSULE | Refills: 3 | Status: SHIPPED | OUTPATIENT
Start: 2024-10-07

## 2024-11-29 DIAGNOSIS — N52.9 ERECTILE DYSFUNCTION, UNSPECIFIED ERECTILE DYSFUNCTION TYPE: ICD-10-CM

## 2024-11-30 RX ORDER — SILDENAFIL 100 MG/1
TABLET, FILM COATED ORAL
Qty: 30 TABLET | Refills: 0 | Status: SHIPPED | OUTPATIENT
Start: 2024-11-30

## 2025-01-09 ENCOUNTER — TELEPHONE (OUTPATIENT)
Dept: PRIMARY CARE | Facility: CLINIC | Age: 60
End: 2025-01-09
Payer: COMMERCIAL

## 2025-01-10 ENCOUNTER — TELEPHONE (OUTPATIENT)
Dept: PRIMARY CARE | Facility: CLINIC | Age: 60
End: 2025-01-10

## 2025-01-20 NOTE — TELEPHONE ENCOUNTER
Patient called in today was looking for his blood work orders. He has appointment set up with Vicente in March, but wants to have something to talk about with her. He is taking time off work and doesn't want to do this twice

## 2025-01-21 NOTE — TELEPHONE ENCOUNTER
Fasting  labs ordered ,   fast min 8 h     - pls do 5-7 d before your appt w dr beyer to review w her at the visit

## 2025-03-05 ENCOUNTER — APPOINTMENT (OUTPATIENT)
Dept: PRIMARY CARE | Facility: CLINIC | Age: 60
End: 2025-03-05
Payer: COMMERCIAL

## 2025-04-06 LAB
ALBUMIN SERPL-MCNC: 4.5 G/DL (ref 3.6–5.1)
ALP SERPL-CCNC: 82 U/L (ref 35–144)
ALT SERPL-CCNC: 26 U/L (ref 9–46)
ANION GAP SERPL CALCULATED.4IONS-SCNC: 8 MMOL/L (CALC) (ref 7–17)
AST SERPL-CCNC: 22 U/L (ref 10–35)
BILIRUB SERPL-MCNC: 1.2 MG/DL (ref 0.2–1.2)
BUN SERPL-MCNC: 26 MG/DL (ref 7–25)
CALCIUM SERPL-MCNC: 9.7 MG/DL (ref 8.6–10.3)
CHLORIDE SERPL-SCNC: 105 MMOL/L (ref 98–110)
CHOLEST SERPL-MCNC: 267 MG/DL
CHOLEST/HDLC SERPL: 5.6 (CALC)
CO2 SERPL-SCNC: 27 MMOL/L (ref 20–32)
CREAT SERPL-MCNC: 1.2 MG/DL (ref 0.7–1.3)
EGFRCR SERPLBLD CKD-EPI 2021: 70 ML/MIN/1.73M2
ERYTHROCYTE [DISTWIDTH] IN BLOOD BY AUTOMATED COUNT: 12.9 % (ref 11–15)
EST. AVERAGE GLUCOSE BLD GHB EST-MCNC: 117 MG/DL
EST. AVERAGE GLUCOSE BLD GHB EST-SCNC: 6.5 MMOL/L
GLUCOSE SERPL-MCNC: 103 MG/DL (ref 65–99)
HBA1C MFR BLD: 5.7 % OF TOTAL HGB
HCT VFR BLD AUTO: 45.1 % (ref 38.5–50)
HDLC SERPL-MCNC: 48 MG/DL
HGB BLD-MCNC: 15 G/DL (ref 13.2–17.1)
LDLC SERPL CALC-MCNC: 205 MG/DL (CALC)
MCH RBC QN AUTO: 30.4 PG (ref 27–33)
MCHC RBC AUTO-ENTMCNC: 33.3 G/DL (ref 32–36)
MCV RBC AUTO: 91.5 FL (ref 80–100)
NONHDLC SERPL-MCNC: 219 MG/DL (CALC)
PLATELET # BLD AUTO: 225 THOUSAND/UL (ref 140–400)
PMV BLD REES-ECKER: 9.7 FL (ref 7.5–12.5)
POTASSIUM SERPL-SCNC: 4.9 MMOL/L (ref 3.5–5.3)
PROT SERPL-MCNC: 7 G/DL (ref 6.1–8.1)
PSA SERPL-MCNC: 0.6 NG/ML
RBC # BLD AUTO: 4.93 MILLION/UL (ref 4.2–5.8)
SODIUM SERPL-SCNC: 140 MMOL/L (ref 135–146)
TRIGL SERPL-MCNC: 51 MG/DL
WBC # BLD AUTO: 4.6 THOUSAND/UL (ref 3.8–10.8)

## 2025-05-14 ENCOUNTER — APPOINTMENT (OUTPATIENT)
Dept: PRIMARY CARE | Facility: CLINIC | Age: 60
End: 2025-05-14
Payer: COMMERCIAL

## 2025-05-14 ENCOUNTER — HOSPITAL ENCOUNTER (OUTPATIENT)
Dept: RADIOLOGY | Facility: CLINIC | Age: 60
Discharge: HOME | End: 2025-05-14
Payer: COMMERCIAL

## 2025-05-14 VITALS
HEART RATE: 58 BPM | OXYGEN SATURATION: 97 % | BODY MASS INDEX: 27.1 KG/M2 | RESPIRATION RATE: 16 BRPM | WEIGHT: 200.1 LBS | SYSTOLIC BLOOD PRESSURE: 111 MMHG | DIASTOLIC BLOOD PRESSURE: 74 MMHG | TEMPERATURE: 97.5 F | HEIGHT: 72 IN

## 2025-05-14 DIAGNOSIS — R05.3 CHRONIC COUGH: ICD-10-CM

## 2025-05-14 DIAGNOSIS — N13.8 BPH WITH OBSTRUCTION/LOWER URINARY TRACT SYMPTOMS: ICD-10-CM

## 2025-05-14 DIAGNOSIS — E66.3 OVERWEIGHT WITH BODY MASS INDEX (BMI) OF 27 TO 27.9 IN ADULT: ICD-10-CM

## 2025-05-14 DIAGNOSIS — J30.2 SEASONAL ALLERGIC RHINITIS, UNSPECIFIED TRIGGER: ICD-10-CM

## 2025-05-14 DIAGNOSIS — E78.00 ELEVATED LDL CHOLESTEROL LEVEL: Chronic | ICD-10-CM

## 2025-05-14 DIAGNOSIS — Z00.00 ENCOUNTER FOR ADULT WELLNESS VISIT: Primary | ICD-10-CM

## 2025-05-14 DIAGNOSIS — N40.1 BPH WITH OBSTRUCTION/LOWER URINARY TRACT SYMPTOMS: ICD-10-CM

## 2025-05-14 DIAGNOSIS — N52.9 ERECTILE DYSFUNCTION, UNSPECIFIED ERECTILE DYSFUNCTION TYPE: ICD-10-CM

## 2025-05-14 DIAGNOSIS — Z12.11 SCREENING FOR COLON CANCER: ICD-10-CM

## 2025-05-14 DIAGNOSIS — R73.03 PREDIABETES: Chronic | ICD-10-CM

## 2025-05-14 PROCEDURE — 90471 IMMUNIZATION ADMIN: CPT | Performed by: FAMILY MEDICINE

## 2025-05-14 PROCEDURE — 99396 PREV VISIT EST AGE 40-64: CPT | Performed by: FAMILY MEDICINE

## 2025-05-14 PROCEDURE — 71046 X-RAY EXAM CHEST 2 VIEWS: CPT

## 2025-05-14 PROCEDURE — 90715 TDAP VACCINE 7 YRS/> IM: CPT | Performed by: FAMILY MEDICINE

## 2025-05-14 PROCEDURE — 1036F TOBACCO NON-USER: CPT | Performed by: FAMILY MEDICINE

## 2025-05-14 PROCEDURE — 3008F BODY MASS INDEX DOCD: CPT | Performed by: FAMILY MEDICINE

## 2025-05-14 PROCEDURE — 99214 OFFICE O/P EST MOD 30 MIN: CPT | Performed by: FAMILY MEDICINE

## 2025-05-14 PROCEDURE — 71046 X-RAY EXAM CHEST 2 VIEWS: CPT | Performed by: RADIOLOGY

## 2025-05-14 RX ORDER — SILDENAFIL 100 MG/1
100 TABLET, FILM COATED ORAL AS NEEDED
Qty: 30 TABLET | Refills: 0 | Status: SHIPPED | OUTPATIENT
Start: 2025-05-14

## 2025-05-14 RX ORDER — MONTELUKAST SODIUM 10 MG/1
10 TABLET ORAL NIGHTLY
Qty: 30 TABLET | Refills: 0 | Status: SHIPPED | OUTPATIENT
Start: 2025-05-14 | End: 2025-11-10

## 2025-05-14 RX ORDER — ATORVASTATIN CALCIUM 10 MG/1
10 TABLET, FILM COATED ORAL DAILY
Qty: 100 TABLET | Refills: 3 | Status: SHIPPED | OUTPATIENT
Start: 2025-05-14 | End: 2026-06-18

## 2025-05-14 RX ORDER — TAMSULOSIN HYDROCHLORIDE 0.4 MG/1
0.4 CAPSULE ORAL DAILY
Qty: 90 CAPSULE | Refills: 3 | Status: SHIPPED | OUTPATIENT
Start: 2025-05-14

## 2025-05-14 ASSESSMENT — PATIENT HEALTH QUESTIONNAIRE - PHQ9
2. FEELING DOWN, DEPRESSED OR HOPELESS: NOT AT ALL
SUM OF ALL RESPONSES TO PHQ9 QUESTIONS 1 AND 2: 0
1. LITTLE INTEREST OR PLEASURE IN DOING THINGS: NOT AT ALL

## 2025-05-14 NOTE — PROGRESS NOTES
Subjective   Patient ID: Miguel Angel Qiu is a 59 y.o. male who presents for Back Pain, Cough (X few years, productive, sees black specks in sputum intermittently ()), prostate issues, and lab review (The patient was made aware that AI (artificial intelligence) technology will be utilized during today's visit. The patient expressed understanding and verbally accepts the use of AI technology.  ).  History of Present Illness  Cscope- 5/11/2020, repeat in 5 years  Immunizations: due for Tdap    Miguel Angel Qiu is a 59 year old male who presents for an annual wellness visit and evaluation of multiple health concerns.    Cough  He has a persistent cough that has been ongoing for several years, exacerbated by laughing and deep breathing. The cough is productive with phlegm, sometimes containing black specks, which he attributes to his occupation as a . He denies current tobacco use but smoked as a teenager. Pulmonary function testing in December 2023 showed a mild obstructive defect.    BPH  He has a history of prostate issues and experiences significant urinary symptoms if he forgets to take his Flomax, including painful urination and feeling like he is not fully emptying his bladder. He reports frequent urination with small volumes and occasional post-void dribbling. His PSA levels have been stable over the years and within low normal range. He has tried sitting to urinate, but this cause more dribbling with standing. He does not follow with urology.     He describes a recent episode of back pain that began on April 7th, without any specific inciting event. The pain was initially severe, particularly on the right side, and has persisted for five weeks, though it has improved over time. Tylenol and ibuprofen have not been particularly effective. He has a history of performing exercises to strengthen his back and questions whether these could be contributing to his pain. Overall,he is improving but wanted  to mention it.    He reports a runny nose and nasal congestion for the past week, which he attributes to allergies. He has been using over-the-counter sinus medications. He experiences a sore throat in the mornings, likely due to nasal drainage.    He has a history of high cholesterol and prediabetes (A1c 5.7). He does not follow a specific diet but describes his eating habits as generally healthy, with the exception of consuming ice cream regularly. He exercises five days a week, focusing on strength training rather than cardio.    Review of Systems  ROS otherwise negative aside from what was mentioned above in HPI.    Objective     /74 (BP Location: Left arm, Patient Position: Sitting, BP Cuff Size: Large adult)   Pulse 58   Temp 36.4 °C (97.5 °F) (Temporal)   Resp 16   Ht 1.829 m (6')   Wt 90.8 kg (200 lb 1.6 oz)   SpO2 97%   BMI 27.14 kg/m²      Current Outpatient Medications   Medication Instructions    atorvastatin (LIPITOR) 10 mg, oral, Daily    montelukast (SINGULAIR) 10 mg, oral, Nightly    sildenafil (VIAGRA) 100 mg, oral, As needed    tamsulosin (FLOMAX) 0.4 mg, oral, Daily       Physical Exam  VITALS: BP- 111/74  Constitutional: Well developed, well nourished, alert and in no acute distress.  Head and Face: Normocephalic, atraumatic.  Eyes: Normal external exam. Pupils equally round and reactive to light with normal accommodation and extraocular movements intact.   ENT: External inspection of ears normal, tympanic membranes visualized and normal. Nasal mucosa, septum, and turbinates normal. Oral mucosa moist, oropharynx clear.   Neck: Supple, no lymphadenopathy or masses. Thyroid not enlarged, no palpable nodules.   Cardiovascular: Regular rate and rhythm, normal S1 and S2, no murmurs, gallops, or rubs. Radial pulses normal. No peripheral edema.   Pulmonary: No respiratory distress, lungs clear to auscultation bilaterally. No wheezes, rhonchi, rales.  Abdomen: Soft, nontender, nondistended,  normal bowel sounds. No masses palpated. No hernias.  Musculoskeletal: Gait normal. Muscle strength/tone normal. Normal range of motion of all extremities.   Skin: Warm, well perfused, normal skin turgor and color, no lesions or rashes noted.   Neurologic: Cranial nerves II-XII grossly intact. Deep tendon reflexes were 2+ and symmetric. Sensation normal bilaterally.   Psychiatric: Mood calm and affect normal.    Results  CBC:   Lab Results   Component Value Date    WBC 4.6 04/05/2025    RBC 4.93 04/05/2025    HGB 15.0 04/05/2025    HCT 45.1 04/05/2025    MCV 91.5 04/05/2025    MCH 30.4 04/05/2025    MCHC 33.3 04/05/2025     04/05/2025    MPV 9.7 04/05/2025       CBC w/Diff:   Lab Results   Component Value Date    WBC 4.6 04/05/2025    NRBC 0.0 12/09/2023    RBC 4.93 04/05/2025    HGB 15.0 04/05/2025    HCT 45.1 04/05/2025    MCV 91.5 04/05/2025    MCHC 33.3 04/05/2025     04/05/2025    RDW 12.9 04/05/2025        CMP:  Lab Results   Component Value Date    GLUCOSE 103 (H) 04/05/2025     04/05/2025    K 4.9 04/05/2025     04/05/2025    CO2 27 04/05/2025    ANIONGAP 8 04/05/2025    BUN 26 (H) 04/05/2025    CREATININE 1.20 04/05/2025    CALCIUM 9.7 04/05/2025    ALBUMIN 4.5 04/05/2025    ALKPHOS 82 04/05/2025    PROT 7.0 04/05/2025    AST 22 04/05/2025    BILITOT 1.2 04/05/2025    ALT 26 04/05/2025        BMP:  Lab Results   Component Value Date    GLUCOSE 103 (H) 04/05/2025    BUN 26 (H) 04/05/2025    CREATININE 1.20 04/05/2025     04/05/2025    K 4.9 04/05/2025     04/05/2025    CO2 27 04/05/2025    ANIONGAP 8 04/05/2025    CALCIUM 9.7 04/05/2025    EGFR 70 04/05/2025        Lipid:   Lab Results   Component Value Date    CHOL 267 (H) 04/05/2025    HDL 48 04/05/2025    CHHDL 5.6 (H) 04/05/2025    LDLCALC 205 (H) 04/05/2025    VLDL 24 12/09/2023    TRIG 51 04/05/2025     HgA1c:   Lab Results   Component Value Date    HGBA1C 5.7 (H) 04/05/2025    JAWIKMBY3A 117 04/05/2025     PSA:    Lab Results   Component Value Date    PSA 0.60 04/05/2025     RADIOLOGY  CT cardiac score: 58.2 in 2024 (previously 1.6)    DIAGNOSTIC REPORTS  Pulmonary function testing: mild defect (12/2023)    Assessment & Plan  Reactive airway disease  Chronic productive cough with black specks, likely due to occupational exposure as a . Pulmonary function testing in December 2023 showed a mild defect. Differential includes reactive airway disease, asthma, and allergies. Symptoms include nocturnal wheezing, triggered by laughing and deep breathing. A chest x-ray is planned to rule out other causes. Singulair is proposed to address both cough and allergies.  - Order chest x-ray  - Initiate trial of Singulair (montelukast) for two weeks, one pill at bedtime    Hyperlipidemia  Cholesterol levels are elevated, with a significant increase in LDL. CT cardiac score increased from 1.6 to 58.2 over five years, indicating plaque buildup in the left anterior descending artery. Discussed the benefits of statin therapy to stabilize plaque and prevent further buildup. He agreed to start Lipitor to reduce the risk of future cardiac events.  - Initiate Lipitor (atorvastatin) 10 mg at bedtime, we discussed risks and benefits  - Recheck cholesterol in six months    Benign prostatic hyperplasia  Urinary symptoms include slow stream, incomplete bladder emptying, and frequent urination. Symptoms improve with Flomax, indicating effectiveness.   - Continue Flomax as prescribed  -consider referral to urology  -psa within normal range and stable    Prediabetes  A1c is 5.7, indicating prediabetes. He exercises regularly but does not follow a specific diet. Advised to monitor diet, especially sugar intake, to prevent progression to diabetes.  -reduce carbs and sugar (bread, pasta, rice and potatoes)    General Health Maintenance  Due for colonoscopy as last one was in 2020. Tetanus shot is outdated, and Tdap is recommended due to the resurgence  of pertussis.  - Order colonoscopy  - Administered Tdap vaccine today      Trixie Zhang DO     This medical note was created with the assistance of artificial intelligence (AI) for documentation purposes. The content has been reviewed and confirmed by the healthcare provider for accuracy and completeness. Patient consented to the use of audio recording and use of AI during their visit.

## 2025-06-30 ENCOUNTER — AMBULATORY SURGICAL CENTER (OUTPATIENT)
Dept: URBAN - METROPOLITAN AREA SURGERY 12 | Facility: SURGERY | Age: 60
End: 2025-06-30
Payer: COMMERCIAL

## 2025-06-30 VITALS
DIASTOLIC BLOOD PRESSURE: 60 MMHG | DIASTOLIC BLOOD PRESSURE: 41 MMHG | RESPIRATION RATE: 16 BRPM | SYSTOLIC BLOOD PRESSURE: 70 MMHG | RESPIRATION RATE: 15 BRPM | SYSTOLIC BLOOD PRESSURE: 78 MMHG | HEIGHT: 72 IN | DIASTOLIC BLOOD PRESSURE: 60 MMHG | DIASTOLIC BLOOD PRESSURE: 50 MMHG | HEART RATE: 67 BPM | SYSTOLIC BLOOD PRESSURE: 77 MMHG | SYSTOLIC BLOOD PRESSURE: 96 MMHG | HEART RATE: 83 BPM | WEIGHT: 192 LBS | SYSTOLIC BLOOD PRESSURE: 70 MMHG | RESPIRATION RATE: 13 BRPM | RESPIRATION RATE: 6 BRPM | HEART RATE: 97 BPM | HEART RATE: 62 BPM | DIASTOLIC BLOOD PRESSURE: 41 MMHG | HEART RATE: 93 BPM | DIASTOLIC BLOOD PRESSURE: 49 MMHG | SYSTOLIC BLOOD PRESSURE: 84 MMHG | HEART RATE: 102 BPM | RESPIRATION RATE: 19 BRPM | RESPIRATION RATE: 6 BRPM | SYSTOLIC BLOOD PRESSURE: 91 MMHG | SYSTOLIC BLOOD PRESSURE: 82 MMHG | OXYGEN SATURATION: 98 % | RESPIRATION RATE: 13 BRPM | HEART RATE: 73 BPM | HEART RATE: 62 BPM | OXYGEN SATURATION: 98 % | SYSTOLIC BLOOD PRESSURE: 84 MMHG | RESPIRATION RATE: 11 BRPM | RESPIRATION RATE: 8 BRPM | DIASTOLIC BLOOD PRESSURE: 39 MMHG | RESPIRATION RATE: 19 BRPM | HEART RATE: 73 BPM | SYSTOLIC BLOOD PRESSURE: 77 MMHG | RESPIRATION RATE: 11 BRPM | SYSTOLIC BLOOD PRESSURE: 79 MMHG | HEART RATE: 67 BPM | OXYGEN SATURATION: 94 % | HEART RATE: 94 BPM | HEART RATE: 81 BPM | DIASTOLIC BLOOD PRESSURE: 42 MMHG | RESPIRATION RATE: 8 BRPM | HEART RATE: 83 BPM | DIASTOLIC BLOOD PRESSURE: 81 MMHG | DIASTOLIC BLOOD PRESSURE: 49 MMHG | SYSTOLIC BLOOD PRESSURE: 91 MMHG | DIASTOLIC BLOOD PRESSURE: 39 MMHG | DIASTOLIC BLOOD PRESSURE: 51 MMHG | SYSTOLIC BLOOD PRESSURE: 115 MMHG | HEART RATE: 80 BPM | OXYGEN SATURATION: 94 % | RESPIRATION RATE: 12 BRPM | SYSTOLIC BLOOD PRESSURE: 79 MMHG | SYSTOLIC BLOOD PRESSURE: 73 MMHG | HEART RATE: 93 BPM | RESPIRATION RATE: 15 BRPM | RESPIRATION RATE: 14 BRPM | SYSTOLIC BLOOD PRESSURE: 121 MMHG | DIASTOLIC BLOOD PRESSURE: 50 MMHG | HEART RATE: 97 BPM | HEIGHT: 72 IN | HEART RATE: 91 BPM | HEART RATE: 81 BPM | HEART RATE: 89 BPM | RESPIRATION RATE: 14 BRPM | HEART RATE: 94 BPM | RESPIRATION RATE: 12 BRPM | DIASTOLIC BLOOD PRESSURE: 40 MMHG | WEIGHT: 192 LBS | SYSTOLIC BLOOD PRESSURE: 96 MMHG | RESPIRATION RATE: 16 BRPM | HEART RATE: 89 BPM | HEART RATE: 91 BPM | OXYGEN SATURATION: 97 % | SYSTOLIC BLOOD PRESSURE: 115 MMHG | SYSTOLIC BLOOD PRESSURE: 78 MMHG | DIASTOLIC BLOOD PRESSURE: 40 MMHG | DIASTOLIC BLOOD PRESSURE: 51 MMHG | HEART RATE: 80 BPM | HEART RATE: 102 BPM | SYSTOLIC BLOOD PRESSURE: 121 MMHG | SYSTOLIC BLOOD PRESSURE: 73 MMHG | OXYGEN SATURATION: 97 % | SYSTOLIC BLOOD PRESSURE: 82 MMHG | DIASTOLIC BLOOD PRESSURE: 42 MMHG | DIASTOLIC BLOOD PRESSURE: 81 MMHG

## 2025-06-30 DIAGNOSIS — Z09 ENCOUNTER FOR FOLLOW-UP EXAMINATION AFTER COMPLETED TREATMEN: ICD-10-CM

## 2025-06-30 DIAGNOSIS — Z86.0101 PERSONAL HISTORY OF ADENOMATOUS AND SERRATED COLON POLYPS: ICD-10-CM

## 2025-06-30 DIAGNOSIS — K64.8 OTHER HEMORRHOIDS: ICD-10-CM

## 2025-06-30 DIAGNOSIS — K64.4 RESIDUAL HEMORRHOIDAL SKIN TAGS: ICD-10-CM

## 2025-06-30 PROCEDURE — G0105 COLORECTAL SCRN; HI RISK IND: HCPCS | Performed by: INTERNAL MEDICINE
